# Patient Record
Sex: FEMALE | Race: BLACK OR AFRICAN AMERICAN | NOT HISPANIC OR LATINO | Employment: FULL TIME | URBAN - METROPOLITAN AREA
[De-identification: names, ages, dates, MRNs, and addresses within clinical notes are randomized per-mention and may not be internally consistent; named-entity substitution may affect disease eponyms.]

---

## 2018-07-01 ENCOUNTER — HOSPITAL ENCOUNTER (EMERGENCY)
Facility: HOSPITAL | Age: 63
Discharge: HOME | End: 2018-07-01
Payer: COMMERCIAL

## 2018-07-01 VITALS
SYSTOLIC BLOOD PRESSURE: 115 MMHG | TEMPERATURE: 98 F | OXYGEN SATURATION: 98 % | BODY MASS INDEX: 25.39 KG/M2 | RESPIRATION RATE: 18 BRPM | HEART RATE: 76 BPM | HEIGHT: 68 IN | DIASTOLIC BLOOD PRESSURE: 56 MMHG | WEIGHT: 167.56 LBS

## 2018-07-01 DIAGNOSIS — M54.2 NECK PAIN: ICD-10-CM

## 2018-07-01 DIAGNOSIS — S16.1XXA ACUTE STRAIN OF NECK MUSCLE, INITIAL ENCOUNTER: Primary | ICD-10-CM

## 2018-07-01 PROCEDURE — 96374 THER/PROPH/DIAG INJ IV PUSH: CPT

## 2018-07-01 PROCEDURE — 99283 EMERGENCY DEPT VISIT LOW MDM: CPT | Mod: 25

## 2018-07-01 PROCEDURE — 63600175 PHARM REV CODE 636 W HCPCS: Performed by: NURSE PRACTITIONER

## 2018-07-01 RX ORDER — KETOROLAC TROMETHAMINE 10 MG/1
10 TABLET, FILM COATED ORAL EVERY 6 HOURS
Qty: 12 TABLET | Refills: 0 | Status: SHIPPED | OUTPATIENT
Start: 2018-07-01 | End: 2019-04-01 | Stop reason: ALTCHOICE

## 2018-07-01 RX ORDER — KETOROLAC TROMETHAMINE 30 MG/ML
30 INJECTION, SOLUTION INTRAMUSCULAR; INTRAVENOUS
Status: COMPLETED | OUTPATIENT
Start: 2018-07-01 | End: 2018-07-01

## 2018-07-01 RX ORDER — CYCLOBENZAPRINE HCL 10 MG
10 TABLET ORAL 3 TIMES DAILY PRN
Qty: 15 TABLET | Refills: 0 | Status: SHIPPED | OUTPATIENT
Start: 2018-07-01 | End: 2018-07-06

## 2018-07-01 RX ADMIN — KETOROLAC TROMETHAMINE 30 MG: 30 INJECTION, SOLUTION INTRAMUSCULAR at 05:07

## 2018-07-01 NOTE — ED NOTES
Pt stable, in NAD, and states no further needs at this time.NP aware of vitals. Pt to be d/c'd home.

## 2018-07-01 NOTE — ED PROVIDER NOTES
Encounter Date: 7/1/2018       History     Chief Complaint   Patient presents with    Neck Pain     The history is provided by the patient.   Neck Pain    This is a new problem. Illness onset: 1 week ago  The problem occurs intermittently. The problem has been waxing and waning. The pain is associated with nothing. The pain is present in the right side (to left side of hrad ). The quality of the pain is described as aching. The pain radiates to the left shoulder. The pain is at a severity of 5/10. The symptoms are aggravated by twisting. The pain is worse during the day. Stiffness is present in the morning. Associated symptoms include headaches. Pertinent negatives include no photophobia, no visual change, no chest pain, no syncope, no numbness, no weight loss, no bowel incontinence, no bladder incontinence, no leg pain, no paresis, no tingling and no weakness. Treatments tried: tylenol. The treatment provided moderate relief.     Review of patient's allergies indicates:   Allergen Reactions    Sulfa (sulfonamide antibiotics) Other (See Comments)     Past Medical History:   Diagnosis Date    Cardiomegaly      Past Surgical History:   Procedure Laterality Date    BREAST LUMPECTOMY Left     CHOLECYSTECTOMY      ROTATOR CUFF REPAIR Right     TUBAL LIGATION Bilateral      Family History   Problem Relation Age of Onset    Glaucoma Brother      Social History   Substance Use Topics    Smoking status: Never Smoker    Smokeless tobacco: Not on file    Alcohol use No     Review of Systems   Constitutional: Negative for fever and weight loss.   HENT: Negative for sore throat.    Eyes: Negative for photophobia.   Respiratory: Negative for shortness of breath.    Cardiovascular: Negative for chest pain and syncope.   Gastrointestinal: Negative for bowel incontinence and nausea.   Genitourinary: Negative for bladder incontinence and dysuria.   Musculoskeletal: Positive for neck pain. Negative for back pain.   Skin:  Negative for rash.   Neurological: Positive for headaches. Negative for tingling, weakness and numbness.   Hematological: Does not bruise/bleed easily.   All other systems reviewed and are negative.      Physical Exam     Initial Vitals [07/01/18 1631]   BP Pulse Resp Temp SpO2   (!) 115/56 76 18 98.4 °F (36.9 °C) 98 %      MAP       --         Physical Exam    Nursing note and vitals reviewed.  Constitutional: She appears well-developed and well-nourished.   HENT:   Head: Normocephalic and atraumatic.   Mouth/Throat: No oropharyngeal exudate.   Eyes: Conjunctivae, EOM and lids are normal. Pupils are equal, round, and reactive to light. Lids are everted and swept, no foreign bodies found.   Neck: Trachea normal, normal range of motion, full passive range of motion without pain and phonation normal. Neck supple. No thyroid mass and no thyromegaly present. No edema and normal range of motion present. No neck rigidity. No Brudzinski's sign and no Kernig's sign noted. Carotid bruit is not present. Normal carotid pulses, no hepatojugular reflux and no JVD present.   Cardiovascular: Normal rate, regular rhythm, S1 normal, S2 normal, normal heart sounds, intact distal pulses and normal pulses.   Pulmonary/Chest: Effort normal and breath sounds normal. No respiratory distress.   Abdominal: Soft. Bowel sounds are normal.   Lymphadenopathy:     She has no cervical adenopathy.     She has no axillary adenopathy.   Neurological: She is alert and oriented to person, place, and time. She has normal strength and normal reflexes. No cranial nerve deficit or sensory deficit. She displays a negative Romberg sign.   Skin: Skin is warm and dry. Capillary refill takes less than 2 seconds.         ED Course   Procedures  Labs Reviewed - No data to display       Imaging Results          X-Ray Cervical Spine Complete 5 view (In process)                      Imaging Results          X-Ray Cervical Spine Complete 5 view (Final result)   Result time 07/01/18 17:12:28    Final result by Humphrey Perez Jr., MD (07/01/18 17:12:28)                 Impression:      Marked multilevel degenerative change with neural foraminal narrowing bilaterally.  No acute findings cervical spine.      Electronically signed by: Humphrey Perez Jr., MD  Date:    07/01/2018  Time:    17:12             Narrative:    EXAMINATION:  XR CERVICAL SPINE COMPLETE 5 VIEW    CLINICAL HISTORY:  Cervicalgia    COMPARISON:  None    FINDINGS:  Extensive multilevel disc space narrowing with anterior as well as posterior spurring.  There is grade 1 retrolisthesis of C3 on C4 likely degenerative.  Extensive facet hypertrophy bilaterally.  Prevertebral soft tissues are normal.  Lung apices are clear.  Severe bilateral neural foraminal narrowing bilaterally throughout the cervical spine.  Dens is intact.                                     Regarding CERVICALGIA, I recommended patient use relaxation techniques and regular exercise to prevent unwanted stress and tension to the neck muscles. Advised patient to: follow up with primary care provider; consider physical therapy, learn stretching exercises for your neck and upper body; use good posture; keep back supported; stretch neck every hour or so; use a headset when on the telephone; make sure pillow is properly and comfortably supporting head and neck; and take all medications as prescribed.          All historical, clinical, radiographic, and laboratory findings were reviewed with the patient in detail.  Findings are consistent with a diagnosis of cervical strain.  All remaining questions and concerns were addressed at that time.  Patient has been counseled regarding the need for follow-up as well as the indication to return to the emergency room should new or worrisome developments occur.         Clinical Impression:   The primary encounter diagnosis was Acute strain of neck muscle, initial encounter. A diagnosis of Neck pain was also  pertinent to this visit.                             Uday Michael, GHADA  07/01/18 [id number]

## 2019-04-01 PROBLEM — K58.9 IRRITABLE BOWEL SYNDROME: Status: ACTIVE | Noted: 2018-07-26

## 2019-04-01 PROBLEM — I10 ESSENTIAL HYPERTENSION: Status: ACTIVE | Noted: 2018-07-26

## 2019-04-01 PROBLEM — C50.412 MALIGNANT NEOPLASM OF UPPER-OUTER QUADRANT OF LEFT FEMALE BREAST: Status: ACTIVE | Noted: 2017-12-22

## 2019-04-01 PROBLEM — R53.83 OTHER FATIGUE: Status: ACTIVE | Noted: 2019-04-01

## 2019-04-01 PROBLEM — R94.31 NONSPECIFIC ABNORMAL ELECTROCARDIOGRAM (ECG) (EKG): Status: ACTIVE | Noted: 2019-04-01

## 2019-04-18 PROBLEM — E78.1 HYPERTRIGLYCERIDEMIA: Status: ACTIVE | Noted: 2019-04-18

## 2019-04-18 PROBLEM — M81.8 OTHER OSTEOPOROSIS WITHOUT CURRENT PATHOLOGICAL FRACTURE: Status: ACTIVE | Noted: 2019-04-18

## 2019-04-18 PROBLEM — E55.9 VITAMIN D DEFICIENCY DISEASE: Status: ACTIVE | Noted: 2019-04-18

## 2019-08-22 PROBLEM — R73.09 ELEVATED GLUCOSE: Status: ACTIVE | Noted: 2019-08-22

## 2019-12-27 PROBLEM — E74.39 GLUCOSE INTOLERANCE: Status: ACTIVE | Noted: 2019-12-27

## 2021-09-15 PROBLEM — F43.29 STRESS AND ADJUSTMENT REACTION: Status: ACTIVE | Noted: 2021-09-15

## 2023-08-02 ENCOUNTER — OFFICE VISIT (OUTPATIENT)
Dept: OPHTHALMOLOGY | Facility: CLINIC | Age: 68
End: 2023-08-02
Payer: MEDICARE

## 2023-08-02 DIAGNOSIS — H25.11 NUCLEAR SCLEROSIS OF RIGHT EYE: Primary | ICD-10-CM

## 2023-08-02 DIAGNOSIS — H40.023 OPEN ANGLE WITH BORDERLINE FINDINGS AND HIGH GLAUCOMA RISK IN BOTH EYES: ICD-10-CM

## 2023-08-02 DIAGNOSIS — H25.12 NUCLEAR SCLEROSIS OF LEFT EYE: ICD-10-CM

## 2023-08-02 PROCEDURE — 99213 OFFICE O/P EST LOW 20 MIN: CPT | Mod: PBBFAC | Performed by: STUDENT IN AN ORGANIZED HEALTH CARE EDUCATION/TRAINING PROGRAM

## 2023-08-02 PROCEDURE — 99999 PR PBB SHADOW E&M-EST. PATIENT-LVL III: ICD-10-PCS | Mod: PBBFAC,,, | Performed by: STUDENT IN AN ORGANIZED HEALTH CARE EDUCATION/TRAINING PROGRAM

## 2023-08-02 PROCEDURE — 92133 POSTERIOR SEGMENT OCT OPTIC NERVE(OCULAR COHERENCE TOMOGRAPHY) - OU - BOTH EYES: ICD-10-PCS | Mod: 26,S$PBB,, | Performed by: STUDENT IN AN ORGANIZED HEALTH CARE EDUCATION/TRAINING PROGRAM

## 2023-08-02 PROCEDURE — 99204 OFFICE O/P NEW MOD 45 MIN: CPT | Mod: S$PBB,,, | Performed by: STUDENT IN AN ORGANIZED HEALTH CARE EDUCATION/TRAINING PROGRAM

## 2023-08-02 PROCEDURE — 99999 PR PBB SHADOW E&M-EST. PATIENT-LVL III: CPT | Mod: PBBFAC,,, | Performed by: STUDENT IN AN ORGANIZED HEALTH CARE EDUCATION/TRAINING PROGRAM

## 2023-08-02 PROCEDURE — 92133 CPTRZD OPH DX IMG PST SGM ON: CPT | Mod: PBBFAC | Performed by: STUDENT IN AN ORGANIZED HEALTH CARE EDUCATION/TRAINING PROGRAM

## 2023-08-02 PROCEDURE — 99204 PR OFFICE/OUTPT VISIT, NEW, LEVL IV, 45-59 MIN: ICD-10-PCS | Mod: S$PBB,,, | Performed by: STUDENT IN AN ORGANIZED HEALTH CARE EDUCATION/TRAINING PROGRAM

## 2023-08-02 NOTE — PROGRESS NOTES
HPI     Annual Exam     Additional comments: Pt states va is stable. Pt denies any pain or   irritation.          Last edited by Becca Mao on 8/2/2023  8:53 AM.            Assessment /Plan     For exam results, see Encounter Report.    Nuclear sclerosis of right eye  Nuclear sclerosis of left eye- - NVS, monitor    Open angle with borderline findings and high glaucoma risk in both eyes  -  Risk factors: +Fhx, Enlarged C/D Ratio, and AA race  Will obtain GOCT annually    Explained that the diagnosis is uncertain and further testing is indicated. Discussed importance of follow up and completion of indicated studies as well as the risk of blindness from untreated/undiagnosed glaucoma.        Return to clinic in 1 year DFE W/ GOCT or PRN

## 2024-02-16 PROBLEM — C50.412 MALIGNANT NEOPLASM OF UPPER-OUTER QUADRANT OF LEFT FEMALE BREAST: Status: RESOLVED | Noted: 2017-12-22 | Resolved: 2024-02-16

## 2024-02-21 ENCOUNTER — HOSPITAL ENCOUNTER (OUTPATIENT)
Dept: RADIOLOGY | Facility: HOSPITAL | Age: 69
Discharge: HOME | End: 2024-02-21
Attending: INTERNAL MEDICINE
Payer: MEDICARE

## 2024-02-21 DIAGNOSIS — G37.9 DEMYELINATING DISEASE OF CENTRAL NERVOUS SYSTEM, UNSPECIFIED: ICD-10-CM

## 2024-02-21 PROCEDURE — 72141 MRI NECK SPINE W/O DYE: CPT | Mod: 26,,, | Performed by: RADIOLOGY

## 2024-02-21 PROCEDURE — 72141 MRI NECK SPINE W/O DYE: CPT | Mod: TC

## 2024-02-23 ENCOUNTER — CLINICAL SUPPORT (OUTPATIENT)
Dept: DIABETES | Facility: CLINIC | Age: 69
End: 2024-02-23
Payer: MEDICARE

## 2024-02-23 VITALS — BODY MASS INDEX: 25.61 KG/M2 | WEIGHT: 153.88 LBS

## 2024-02-23 DIAGNOSIS — E11.9 TYPE 2 DIABETES MELLITUS WITHOUT COMPLICATION, WITHOUT LONG-TERM CURRENT USE OF INSULIN: Primary | ICD-10-CM

## 2024-02-23 DIAGNOSIS — E11.9 TYPE 2 DIABETES MELLITUS WITHOUT COMPLICATION, WITHOUT LONG-TERM CURRENT USE OF INSULIN: ICD-10-CM

## 2024-02-23 PROCEDURE — G0108 DIAB MANAGE TRN  PER INDIV: HCPCS | Mod: PBBFAC

## 2024-02-23 PROCEDURE — 99213 OFFICE O/P EST LOW 20 MIN: CPT | Mod: PBBFAC

## 2024-02-23 PROCEDURE — 99999PBSHW PR PBB SHADOW TECHNICAL ONLY FILED TO HB: Mod: PBBFAC,,,

## 2024-02-23 PROCEDURE — 99999 PR PBB SHADOW E&M-EST. PATIENT-LVL III: CPT | Mod: PBBFAC,,,

## 2024-02-23 RX ORDER — LANCETS
1 EACH MISCELLANEOUS 2 TIMES DAILY
Qty: 11 EACH | Refills: 0 | Status: SHIPPED | OUTPATIENT
Start: 2024-02-23 | End: 2024-06-07 | Stop reason: SDUPTHER

## 2024-02-23 RX ORDER — INSULIN PUMP SYRINGE, 3 ML
EACH MISCELLANEOUS
Qty: 1 EACH | Refills: 0 | Status: SHIPPED | OUTPATIENT
Start: 2024-02-23

## 2024-02-23 RX ORDER — LANCETS 26 GAUGE
1 EACH MISCELLANEOUS 2 TIMES DAILY
Qty: 1 EACH | Refills: 1 | Status: SHIPPED | OUTPATIENT
Start: 2024-02-23 | End: 2024-06-07 | Stop reason: SDUPTHER

## 2024-02-23 NOTE — PROGRESS NOTES
Diabetes Care Specialist Progress Note  Author: Apolonia Sosa RN  Date: 2/23/2024    Program Intake  Reason for Diabetes Program Visit:: Initial Diabetes Assessment  In the last 12 months, have you:: none  Permission to speak with others about care:: no  Continuous Glucose Monitoring  Patient has CGM: No    Lab Results   Component Value Date    LABA1C 6.3 06/25/2020    HGBA1C 6.6 (H) 02/02/2024     CURRENT MEDS  Metformin  mg daily     Clinical    Weight: 69.8 kg (153 lb 14.1 oz)       Body mass index is 25.61 kg/m².    Patient Health Rating  Compared to other people your age, how would you rate your health?: Good    Problem Review  Reviewed Problem List with Patient: yes  Active comorbidities affecting diabetes self-care.: yes  Comorbidities: Hypertension, Cardiovascular Disease  Reviewed health maintenance: yes    Clinical Assessment  Current Diabetes Treatment: Oral Medication  Have you ever experienced hypoglycemia (low blood sugar)?:  (Not checking.)  Have you ever experienced hyperglycemia (high blood sugar)?:  (Not checking.)    Medication Information  How do you obtain your medications?: Patient drives  How many days a week do you miss your medications?: Never  Do you sometimes have difficulty refilling your medications?: No  Medication adherence impacting ability to self-manage diabetes?: No    Labs  Do you have regular lab work to monitor your medications?: Yes  Type of Regular Lab Work: A1c, Microalbumin, CBC, BMP, Cholesterol  Where do you get your labs drawn?: [facility]  Lab Compliance Barriers: No    Nutritional Status  Diet: Regular  Meal Plan 24 Hour Recall: Breakfast, Lunch, Dinner, Snack  Meal Plan 24 Hour Recall - Breakfast: Grits, eggs, & 2 slices of white; Cranberry juice  Meal Plan 24 Hour Recall - Lunch: None.  Meal Plan 24 Hour Recall - Dinner: 2 grilled chicken thighs & green salad; Water  Meal Plan 24 Hour Recall - Snack: Coffee with SF creamer  Change in appetite?: No  Dentation::  Intact  Recent Changes in Weight: No Recent Weight Change  Current nutritional status an area of need that is impacting patient's ability to self-manage diabetes?: No    Additional Social History    Support  Does anyone support you with your diabetes care?: yes  Who supports you?: self  Who takes you to your medical appointments?: self  Does the current support meet the patient's needs?: Yes  Is Support an area impacting ability to self-manage diabetes?: No    Access to Mass Media & Technology  Does the patient have access to any of the following devices or technologies?: Smart phone  Media or technology needs impacting ability to self-manage diabetes?: No    Cognitive/Behavioral Health  Alert and Oriented: Yes  Difficulty Thinking: No  Requires Prompting: No  Requires assistance for routine expression?: No  Cognitive or behavioral barriers impacting ability to self-manage diabetes?: No    Culture/Oriental orthodox  Culture or Advent beliefs that may impact ability to access healthcare: No    Communication  Language preference: English  Hearing Problems: No  Vision Problems: No  Communication needs impacting ability to self-manage diabetes?: No    Health Literacy  Preferred Learning Method: Face to Face, Reading Materials  How often do you need to have someone help you read instructions, pamphlets, or written material from your doctor or pharmacy?: Never  Health literacy needs impacting ability to self-manage diabetes?: No      Diabetes Self-Management Skills Assessment    Diabetes Disease Process/Treatment Options  Patient/caregiver able to state what happens when someone has diabetes.: no  Patient/caregiver knows what type of diabetes they have.: yes  Diabetes Type : Type II  Patient/caregiver able to identify at least three signs and symptoms of diabetes.: no (ID'd fatigue)  Patient able to identify at least three risk factors for diabetes.: no  Diabetes Disease Process/Treatment Options: Skills Assessment Completed:  Yes  Assessment indicates:: Knowledge deficit  Area of need?: Yes    Nutrition/Healthy Eating  Challenges to healthy eating:: other (see comments) (Not knowing what to eat)  Method of carbohydrate measurement:: no method  Patient can identify foods that impact blood sugar.: yes  Patient-identified foods:: starches (bread, pasta, rice, cereal), starchy vegetables (corn, peas, beans)  Nutrition/Healthy Eating Skills Assessment Completed:: Yes  Assessment indicates:: Knowledge deficit, Instruction Needed  Area of need?: Yes    Physical Activity/Exercise  Patient's daily activity level:: moderately active  Patient formally exercises outside of work.: yes  Exercise Type: walking, other (see comments) (Yardwork.)  Intensity: Moderate  Frequency: twice a week  Duration: 45 min  Patient can identify forms of physical activity.: yes  Stated forms of physical activity:: any movement performed by muscles that uses energy, moving to burn calories, yardwork  Patient can identify reasons why exercise/physical activity is important in diabetes management.: no  Physical Activity/Exercise Skills Assessment Completed: : Yes  Assessment indicates:: Knowledge deficit, Instruction Needed  Area of need?: Yes    Medications  Patient is able to describe current diabetes management routine.: yes  Diabetes management routine:: oral medications  Patient is able to identify current diabetes medications, dosages, and appropriate timing of medications.: yes  Patient understands the purpose of the medications taken for diabetes.: no  Patient reports problems or concerns with current medication regimen.: no  Medication Skills Assessment Completed:: Yes  Assessment indicates:: Knowledge deficit  Area of need?: Yes    Home Blood Glucose Monitoring  Patient states that blood sugar is checked at home daily.: no  Reasons for not monitoring:: new diabetes diagnosis  Home Blood Glucose Monitoring Skills Assessment Completed: : Yes  Assessment indicates::  Knowledge deficit, Instruction Needed  Area of need?: Yes    Acute Complications  Acute Complications Skills Assessment Completed: : No  Deffered due to:: Time  Area of need?: Deferred    Chronic Complications  Chronic Complications Skills Assessment Completed: : No  Deferred due to:: Time  Area of need?: Deferred    Psychosocial/Coping  Patient can identify ways of coping with chronic disease.: yes  Patient-stated ways of coping with chronic disease:: support from loved ones  Psychosocial/Coping Skills Assessment Completed: : Yes  Assessment indicates:: Adequate understanding  Area of need?: No      Assessment Summary and Plan    Based on today's diabetes care assessment, the following areas of need were identified:          2/23/2024    12:02 AM   Social   Support No   Access to Mass Media/Tech No   Cognitive/Behavioral Health No   Culture/Advent No   Communication No   Health Literacy No            2/23/2024    12:02 AM   Clinical   Medication Adherence No   Lab Compliance No   Nutritional Status No            2/23/2024    12:02 AM   Diabetes Self-Management Skills   Diabetes Disease Process/Treatment Options Yes-Reviewed pathophysiology of type 2 diabetes, risk factors, and treatment options.      Nutrition/Healthy Eating Yes-See care plan.     Physical Activity/Exercise Yes-Discussed physical activity as it relates to insulin resistance and weight loss.   Ms. Melgoza says she stays active at home, especially when the weather permits. She walks and does yardwork.      Medication Yes-Discussed MOA, onset, side effects, dosage of Metformin.     Home Blood Glucose Monitoring Yes-See care plan.     Acute Complications Deferred.     Chronic Complications Deferred.     Psychosocial/Coping No.            Today's interventions were provided through individual discussion, instruction, and written materials were provided.      Patient verbalized understanding of instruction and written materials.  Pt was able to return  back demonstration of instructions today. Patient understood key points, needs reinforcement and further instruction.     Diabetes Self-Management Care Plan:    Today's Diabetes Self-Management Care Plan was developed with Janet's input. Janet has agreed to work toward the following goal(s) to improve his/her overall diabetes control.      Care Plan: Diabetes Management   Updates made since 1/24/2024 12:00 AM        Problem: Healthy Eating         Goal: Eat 3 meals daily with 30-45g of carbohydrate per meal and 0-15g per snack.    Start Date: 2/23/2024   Expected End Date: 2/23/2025   Priority: High   Barriers: No Barriers Identified   Note:    Educated on increasing veggies and protein.   Reviewed My Plate method.   Adhering to smaller portions.        Task: Reviewed the sources and role of Carbohydrate, Protein, and Fat and how each nutrient impacts blood sugar. Completed 2/23/2024        Task: Provided visual examples using dry measuring cups, food models, and other familiar objects such as computer mouse, deck or cards, tennis ball etc. to help with visualization of portions. Completed 2/23/2024        Task: Explained how to count carbohydrates using the food label and the use of dry measuring cups for accurate carb counting. Completed 2/23/2024        Task: Recommended replacing beverages containing high sugar content with noncaloric/sugar free options and/or water. Completed 2/23/2024        Task: Review the importance of balancing carbohydrates with each meal using portion control techniques to count servings of carbohydrate and label reading to identify serving size and amount of total carbs per serving. Completed 2/23/2024        Task: Provided Sample plate method and reviewed the use of the plate to estimate amounts of carbohydrate per meal. Completed 2/23/2024        Problem: Blood Glucose Self-Monitoring         Goal: Patient agrees to check and record blood sugars 2 times per day.    Start Date:  2/23/2024   Expected End Date: 2/23/2025   Priority: Medium   Barriers: Lack of Supplies   Note:    Insurance did not cover Shira 3, so pt did not get. Sent request to PCP for glucometer.  Educated on checking fasting BG with goal of  mg/dL and 2 hours after one of her meals with a goal of <140 mg/dL.        Task: Sent Rx request to provider to send to patients pharmacy. Completed 2/23/2024        Task: Reviewed the importance of self-monitoring blood glucose and keeping logs. Completed 2/23/2024        Task: Provided patient with blood glucose logs, reviewed appropriate timing and frequency to SMBG, education on parameters on when to notify provider and advised patient to bring logs to all appts with PCP/Endocrinologist/Diabetes Care Specialist. Completed 2/23/2024          Follow Up Plan     Follow up in about 3 weeks (around 3/15/2024) for review of logs and completion of assessment.    Today's care plan and follow up schedule was discussed with patient.  Janet verbalized understanding of the care plan, goals, and agrees to follow up plan.        The patient was encouraged to communicate with his/her health care provider/physician and care team regarding his/her condition(s) and treatment.  I provided the patient with my contact information today and encouraged to contact me via phone or [facility] Patient Portal as needed.     Length of Visit   Total Time: 50 Minutes

## 2024-03-19 NOTE — PROGRESS NOTES
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

## 2024-06-06 DIAGNOSIS — E11.9 TYPE 2 DIABETES MELLITUS WITHOUT COMPLICATION, WITHOUT LONG-TERM CURRENT USE OF INSULIN: ICD-10-CM

## 2024-06-07 RX ORDER — CALCIUM CITRATE/VITAMIN D3 200MG-6.25
TABLET ORAL
Qty: 100 STRIP | Refills: 0 | Status: SHIPPED | OUTPATIENT
Start: 2024-06-07 | End: 2024-06-07 | Stop reason: SDUPTHER

## 2025-02-28 PROBLEM — L80 VITILIGO: Status: ACTIVE | Noted: 2025-02-28

## 2025-02-28 PROBLEM — E78.2 MIXED HYPERLIPIDEMIA: Status: ACTIVE | Noted: 2025-02-28

## 2025-02-28 PROBLEM — E11.9 TYPE 2 DIABETES MELLITUS WITHOUT COMPLICATION, WITHOUT LONG-TERM CURRENT USE OF INSULIN: Status: ACTIVE | Noted: 2025-02-28

## 2025-02-28 PROBLEM — L81.9 HYPOPIGMENTATION: Status: ACTIVE | Noted: 2025-02-28

## 2025-02-28 PROBLEM — R41.3 MEMORY IMPAIRMENT: Status: ACTIVE | Noted: 2024-12-22

## 2025-06-19 ENCOUNTER — TELEPHONE (OUTPATIENT)
Dept: DERMATOLOGY | Facility: CLINIC | Age: 70
End: 2025-06-19
Payer: MEDICARE

## 2025-06-19 NOTE — TELEPHONE ENCOUNTER
Called and notified pt that appt changed from 6/23 to 6/27. Pt verbalized understanding and confirmed appt

## 2025-06-27 ENCOUNTER — LAB VISIT (OUTPATIENT)
Dept: LAB | Facility: HOSPITAL | Age: 70
End: 2025-06-27
Attending: DERMATOLOGY
Payer: MEDICARE

## 2025-06-27 ENCOUNTER — OFFICE VISIT (OUTPATIENT)
Dept: DERMATOLOGY | Facility: CLINIC | Age: 70
End: 2025-06-27
Payer: MEDICARE

## 2025-06-27 DIAGNOSIS — L20.89 OTHER ATOPIC DERMATITIS: Primary | ICD-10-CM

## 2025-06-27 DIAGNOSIS — L65.9 HAIR LOSS: ICD-10-CM

## 2025-06-27 DIAGNOSIS — L81.9 HYPOPIGMENTATION: ICD-10-CM

## 2025-06-27 DIAGNOSIS — E46 PROTEIN-CALORIE MALNUTRITION, UNSPECIFIED SEVERITY: ICD-10-CM

## 2025-06-27 DIAGNOSIS — L81.9 DYSCHROMIA: ICD-10-CM

## 2025-06-27 DIAGNOSIS — L80 VITILIGO: ICD-10-CM

## 2025-06-27 LAB
ABSOLUTE EOSINOPHIL (OHS): 0.05 K/UL
ABSOLUTE MONOCYTE (OHS): 0.5 K/UL (ref 0.3–1)
ABSOLUTE NEUTROPHIL COUNT (OHS): 4.47 K/UL (ref 1.8–7.7)
BASOPHILS # BLD AUTO: 0.04 K/UL
BASOPHILS NFR BLD AUTO: 0.6 %
ERYTHROCYTE [DISTWIDTH] IN BLOOD BY AUTOMATED COUNT: 13.4 % (ref 11.5–14.5)
FERRITIN SERPL-MCNC: 229 NG/ML (ref 20–300)
FOLATE SERPL-MCNC: 16.7 NG/ML (ref 4–24)
HCT VFR BLD AUTO: 42.4 % (ref 37–48.5)
HGB BLD-MCNC: 13.1 GM/DL (ref 12–16)
IMM GRANULOCYTES # BLD AUTO: 0.01 K/UL (ref 0–0.04)
IMM GRANULOCYTES NFR BLD AUTO: 0.2 % (ref 0–0.5)
IRON SATN MFR SERPL: 16 % (ref 20–50)
IRON SERPL-MCNC: 47 UG/DL (ref 30–160)
LYMPHOCYTES # BLD AUTO: 1.44 K/UL (ref 1–4.8)
MCH RBC QN AUTO: 28.1 PG (ref 27–31)
MCHC RBC AUTO-ENTMCNC: 30.9 G/DL (ref 32–36)
MCV RBC AUTO: 91 FL (ref 82–98)
NUCLEATED RBC (/100WBC) (OHS): 0 /100 WBC
PLATELET # BLD AUTO: 218 K/UL (ref 150–450)
PMV BLD AUTO: 10 FL (ref 9.2–12.9)
RBC # BLD AUTO: 4.66 M/UL (ref 4–5.4)
RELATIVE EOSINOPHIL (OHS): 0.8 %
RELATIVE LYMPHOCYTE (OHS): 22.1 % (ref 18–48)
RELATIVE MONOCYTE (OHS): 7.7 % (ref 4–15)
RELATIVE NEUTROPHIL (OHS): 68.6 % (ref 38–73)
T4 FREE SERPL-MCNC: 1.01 NG/DL (ref 0.71–1.51)
TIBC SERPL-MCNC: 287 UG/DL (ref 250–450)
TRANSFERRIN SERPL-MCNC: 194 MG/DL (ref 200–375)
TSH SERPL-ACNC: 0.86 UIU/ML (ref 0.4–4)
VIT B12 SERPL-MCNC: 1768 PG/ML (ref 210–950)
WBC # BLD AUTO: 6.51 K/UL (ref 3.9–12.7)

## 2025-06-27 PROCEDURE — 82607 VITAMIN B-12: CPT

## 2025-06-27 PROCEDURE — 36415 COLL VENOUS BLD VENIPUNCTURE: CPT

## 2025-06-27 PROCEDURE — 82728 ASSAY OF FERRITIN: CPT

## 2025-06-27 PROCEDURE — 99999 PR PBB SHADOW E&M-EST. PATIENT-LVL IV: CPT | Mod: PBBFAC,,, | Performed by: DERMATOLOGY

## 2025-06-27 PROCEDURE — 84443 ASSAY THYROID STIM HORMONE: CPT

## 2025-06-27 PROCEDURE — 84439 ASSAY OF FREE THYROXINE: CPT

## 2025-06-27 PROCEDURE — 85025 COMPLETE CBC W/AUTO DIFF WBC: CPT

## 2025-06-27 PROCEDURE — 82746 ASSAY OF FOLIC ACID SERUM: CPT

## 2025-06-27 PROCEDURE — 83540 ASSAY OF IRON: CPT

## 2025-06-27 RX ORDER — TACROLIMUS 1 MG/G
OINTMENT TOPICAL
Qty: 60 G | Refills: 3 | Status: SHIPPED | OUTPATIENT
Start: 2025-06-27

## 2025-06-27 RX ORDER — MINOXIDIL 5 %
SOLUTION, NON-ORAL TOPICAL
Qty: 60 ML | Refills: 11 | Status: SHIPPED | OUTPATIENT
Start: 2025-06-27

## 2025-06-27 RX ORDER — NAPROXEN SODIUM 220 MG/1
81 TABLET, FILM COATED ORAL DAILY
COMMUNITY

## 2025-06-27 RX ORDER — RUXOLITINIB 15 MG/G
CREAM TOPICAL
Qty: 60 G | Refills: 11 | Status: SHIPPED | OUTPATIENT
Start: 2025-06-27 | End: 2025-06-27

## 2025-06-27 RX ORDER — FLUOCINONIDE TOPICAL SOLUTION USP, 0.05% 0.5 MG/ML
SOLUTION TOPICAL
Qty: 60 ML | Refills: 11 | Status: SHIPPED | OUTPATIENT
Start: 2025-06-27

## 2025-06-27 NOTE — PROGRESS NOTES
Subjective:      Patient ID:  Janet Melgoza is a 69 y.o. female who presents for   Chief Complaint   Patient presents with    Skin Discoloration    Hair Loss     Reports having hair loss all over.  Started last year. Scalp does itch.       History of Present Illness: The patient presents with chief complaint of hypopigmentation (concern for vitiligo).  Location: under the nose and around mouth   Duration: 1 year  Signs/Symptoms: lightening of skin     Prior treatments: pt seen by another dermatologist previously but was prescribed something that caused facial swelling     History of Present Illness: The patient presents with chief complaint of hair loss.  Location: scalp  Duration: 1 year  Signs/Symptoms: thinning    Prior treatments: prescription drops        Review of Systems   Constitutional:  Negative for fever and chills.   Gastrointestinal:  Negative for nausea and vomiting.   Skin:  Positive for activity-related sunscreen use. Negative for daily sunscreen use and recent sunburn.   Hematologic/Lymphatic: Does not bruise/bleed easily.       Objective:   Physical Exam   Constitutional: She appears well-developed and well-nourished. No distress.   Neurological: She is alert and oriented to person, place, and time. She is not disoriented.   Psychiatric: She has a normal mood and affect.   Skin:   Areas Examined (abnormalities noted in diagram):   Scalp / Hair Palpated and Inspected  Head / Face Inspection Performed  Neck Inspection Performed  RUE Inspected  LUE Inspection Performed  Nails and Digits Inspection Performed                            Assessment / Plan:        Other atopic dermatitis  Vitiligo  -     tacrolimus (PROTOPIC) 0.1 % ointment; AAA bid for light spots on face.  Non-steroid. Safe to use long-term  Dispense: 60 g; Refill: 3  -     areas of hypo- and hyperpigmentation, suspect melasma c/ vitiligo.  Possible history of hydroquinone use in the past which could have worsened hypopigmentation,  patient is uncertain.  Pt with allergic reaction to Opzelura prescribed by HARJINDER Dubois.  Will start above med, consider azelaic acid gel once vitiligo improved. Will avoid azelaic acid gel, given may worsen hypopigmentation. The patient acknowledged understanding.     Hypopigmentation  -     Ambulatory referral/consult to Dermatology    Hair loss  -     CBC Auto Differential; Future; Expected date: 06/27/2025  -     Ferritin; Future; Expected date: 06/27/2025  -     Iron and TIBC; Future; Expected date: 06/27/2025  -     Folate; Future; Expected date: 06/27/2025  -     Vitamin B12; Future; Expected date: 06/27/2025  -     TSH; Future; Expected date: 06/27/2025  -     T4, Free; Future; Expected date: 06/27/2025  -     fluocinonide (LIDEX) 0.05 % external solution; Apply to scalp once daily  Dispense: 60 mL; Refill: 11  -     minoxidiL 5 % Soln; Apply to scalp once daily. Do not allow to drip onto face.  Dispense: 60 mL; Refill: 11  -     will check labs, pt endorses avoidance of red meat.  Plan to start fluocinonide qD and minoxidil 8%.  August consistent use of over-the-counter biotin.  The patient acknowledged understanding.    Protein-calorie malnutrition, unspecified severity  -     Folate; Future; Expected date: 06/27/2025  -     Vitamin B12; Future; Expected date: 06/27/2025           Follow up in about 4 months (around 10/27/2025).

## 2025-06-30 ENCOUNTER — RESULTS FOLLOW-UP (OUTPATIENT)
Dept: DERMATOLOGY | Facility: CLINIC | Age: 70
End: 2025-06-30